# Patient Record
Sex: FEMALE | Race: BLACK OR AFRICAN AMERICAN
[De-identification: names, ages, dates, MRNs, and addresses within clinical notes are randomized per-mention and may not be internally consistent; named-entity substitution may affect disease eponyms.]

---

## 2019-04-16 ENCOUNTER — HOSPITAL ENCOUNTER (EMERGENCY)
Dept: HOSPITAL 74 - JERFT | Age: 24
Discharge: HOME | End: 2019-04-16
Payer: COMMERCIAL

## 2019-04-16 VITALS — SYSTOLIC BLOOD PRESSURE: 107 MMHG | DIASTOLIC BLOOD PRESSURE: 40 MMHG | TEMPERATURE: 98 F | HEART RATE: 74 BPM

## 2019-04-16 VITALS — BODY MASS INDEX: 24.1 KG/M2
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
## 2019-04-16 DIAGNOSIS — Z77.21: Primary | ICD-10-CM
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
## 2019-04-16 NOTE — PDOC
Rapid Medical Evaluation


Chief Complaint: Non EmpBld/Body Flud Exposure


Time Seen by Provider: 04/16/19 18:04


Medical Evaluation: 


 Allergies











Allergy/AdvReac Type Severity Reaction Status Date / Time


 


No Known Allergies Allergy   Verified 04/16/19 18:05











04/16/19 18:05


23 year old female c/o body fluid exposure to face and hands in the OR during a 

cystoscopy. patient washed hands and face right of way/


PE: patient alert ox3. 





A: body fluid exposure





P: patient to fast track for further management of care. 





**Discharge Disposition





- Diagnosis


 Patient exposure to body fluids








- Referrals





- Patient Instructions





- Post Discharge Activity


Work/School Note:  Back to Work

## 2019-04-16 NOTE — PDOC
History of Present Illness





- General


Chief Complaint: Non EmpBld/Body Flud Exposure


Stated Complaint: BODY FLUIDS EXPOSURE


Time Seen by Provider: 04/16/19 18:04





- History of Present Illness


Initial Comments: 





04/16/19 18:31


23-year-old female without comorbidities presents for evaluation of body fluid 

exposure. She works as a vendor in the operating room and was flashed with the 

saline from the cystoscope on her left cheek. The fluid did not go in her eyes 

nor her mouth.





Past History





- Past Medical History


Allergies/Adverse Reactions: 


 Allergies











Allergy/AdvReac Type Severity Reaction Status Date / Time


 


No Known Allergies Allergy   Verified 04/16/19 18:05











COPD: No





- Immunization History


Immunization Up to Date: Yes





- Suicide/Smoking/Psychosocial Hx


Smoking History: Never smoked


Hx Alcohol Use: No


Drug/Substance Use Hx: No





**Review of Systems





- Review of Systems


Constitutional: Yes: See HPI





*Physical Exam





- Vital Signs


 Last Vital Signs











Temp Pulse Resp BP Pulse Ox


 


 98.0 F   74   18   107/40 L  100 


 


 04/16/19 18:05  04/16/19 18:05  04/16/19 18:05  04/16/19 18:05  04/16/19 18:05














- Physical Exam


Comments: 





04/16/19 18:32


HEAD: NC/AT


EYES: Conjuntiva clear


NEUROLOGIC: No gross sensory or motor deficits, NVID


SKIN: Normal color and temperature no lesions or rashes





Medical Decision Making





- Medical Decision Making





04/16/19 18:32


Patient was offered HIV prophylaxis but declined. This is a low risk exposure 

she is comfortable to follow up with her primary care physician.





*DC/Admit/Observation/Transfer


Diagnosis at time of Disposition: 


 Patient exposure to body fluids








- Discharge Dispostion


Disposition: HOME


Condition at time of disposition: Stable


Decision to Admit order: No





- Referrals





- Patient Instructions


Printed Discharge Instructions:  How to Handle Body Fluid Exposure -- Non-

Healthcare Worker (At Home, Caregi


Additional Instructions: 


Return to the emergency room should you have any further issues otherwise follow

-up with your primary care physician in one to 2 days for further evaluation 

and treatment options.





- Post Discharge Activity


Forms/Work/School Notes:  Back to Work